# Patient Record
Sex: MALE | Race: WHITE | URBAN - METROPOLITAN AREA
[De-identification: names, ages, dates, MRNs, and addresses within clinical notes are randomized per-mention and may not be internally consistent; named-entity substitution may affect disease eponyms.]

---

## 2018-09-28 ENCOUNTER — APPOINTMENT (RX ONLY)
Dept: URBAN - METROPOLITAN AREA CLINIC 2 | Facility: CLINIC | Age: 83
Setting detail: DERMATOLOGY
End: 2018-09-28

## 2018-09-28 DIAGNOSIS — L57.0 ACTINIC KERATOSIS: ICD-10-CM

## 2018-09-28 DIAGNOSIS — L57.8 OTHER SKIN CHANGES DUE TO CHRONIC EXPOSURE TO NONIONIZING RADIATION: ICD-10-CM

## 2018-09-28 DIAGNOSIS — D69.2 OTHER NONTHROMBOCYTOPENIC PURPURA: ICD-10-CM

## 2018-09-28 PROBLEM — H91.90 UNSPECIFIED HEARING LOSS, UNSPECIFIED EAR: Status: ACTIVE | Noted: 2018-09-28

## 2018-09-28 PROBLEM — L85.3 XEROSIS CUTIS: Status: ACTIVE | Noted: 2018-09-28

## 2018-09-28 PROBLEM — L29.8 OTHER PRURITUS: Status: ACTIVE | Noted: 2018-09-28

## 2018-09-28 PROCEDURE — 17003 DESTRUCT PREMALG LES 2-14: CPT

## 2018-09-28 PROCEDURE — ? COUNSELING

## 2018-09-28 PROCEDURE — 17000 DESTRUCT PREMALG LESION: CPT

## 2018-09-28 PROCEDURE — ? LIQUID NITROGEN

## 2018-09-28 PROCEDURE — 99214 OFFICE O/P EST MOD 30 MIN: CPT | Mod: 25

## 2018-09-28 ASSESSMENT — LOCATION SIMPLE DESCRIPTION DERM
LOCATION SIMPLE: RIGHT FOREARM
LOCATION SIMPLE: POSTERIOR SCALP
LOCATION SIMPLE: RIGHT UPPER BACK
LOCATION SIMPLE: RIGHT HAND
LOCATION SIMPLE: LEFT FOREARM

## 2018-09-28 ASSESSMENT — LOCATION ZONE DERM
LOCATION ZONE: ARM
LOCATION ZONE: HAND
LOCATION ZONE: SCALP
LOCATION ZONE: TRUNK

## 2018-09-28 ASSESSMENT — LOCATION DETAILED DESCRIPTION DERM
LOCATION DETAILED: LEFT DISTAL RADIAL DORSAL FOREARM
LOCATION DETAILED: MID-OCCIPITAL SCALP
LOCATION DETAILED: RIGHT RADIAL DORSAL HAND
LOCATION DETAILED: RIGHT DISTAL RADIAL DORSAL FOREARM
LOCATION DETAILED: RIGHT SUPERIOR UPPER BACK
LOCATION DETAILED: LEFT DISTAL DORSAL FOREARM
LOCATION DETAILED: LEFT PROXIMAL RADIAL DORSAL FOREARM

## 2018-09-28 NOTE — PROCEDURE: LIQUID NITROGEN
Number Of Freeze-Thaw Cycles: 2 freeze-thaw cycles
Detail Level: Simple
Consent: The patient's consent was obtained including but not limited to risks of crusting, scabbing, blistering, scarring, darker or lighter pigmentary change, recurrence, incomplete removal and infection.
Render Post-Care Instructions In Note?: no
Duration Of Freeze Thaw-Cycle (Seconds): 5
Post-Care Instructions: I reviewed with the patient in detail post-care instructions. Patient is to wear sunprotection, and avoid picking at any of the treated lesions. Pt may apply Vaseline to crusted or scabbing areas.